# Patient Record
Sex: FEMALE | Race: WHITE | NOT HISPANIC OR LATINO | URBAN - METROPOLITAN AREA
[De-identification: names, ages, dates, MRNs, and addresses within clinical notes are randomized per-mention and may not be internally consistent; named-entity substitution may affect disease eponyms.]

---

## 2018-04-22 ENCOUNTER — EMERGENCY (EMERGENCY)
Facility: HOSPITAL | Age: 43
LOS: 1 days | Discharge: HOME | End: 2018-04-22
Admitting: EMERGENCY MEDICINE
Payer: SELF-PAY

## 2018-04-22 DIAGNOSIS — Z98.890 OTHER SPECIFIED POSTPROCEDURAL STATES: Chronic | ICD-10-CM

## 2018-04-22 PROCEDURE — 99282 EMERGENCY DEPT VISIT SF MDM: CPT

## 2018-05-09 ENCOUNTER — EMERGENCY (EMERGENCY)
Facility: HOSPITAL | Age: 43
LOS: 1 days | Discharge: HOME | End: 2018-05-09
Admitting: EMERGENCY MEDICINE
Payer: COMMERCIAL

## 2018-05-09 VITALS
SYSTOLIC BLOOD PRESSURE: 120 MMHG | DIASTOLIC BLOOD PRESSURE: 78 MMHG | HEART RATE: 68 BPM | TEMPERATURE: 98 F | RESPIRATION RATE: 16 BRPM | OXYGEN SATURATION: 98 %

## 2018-05-09 DIAGNOSIS — Z98.890 OTHER SPECIFIED POSTPROCEDURAL STATES: Chronic | ICD-10-CM

## 2018-05-09 PROCEDURE — 99282 EMERGENCY DEPT VISIT SF MDM: CPT

## 2018-05-09 NOTE — ED PROVIDER NOTE - OBJECTIVE STATEMENT
43 yo F denies pmhx here for right ear pain x 2 days. denies injury/trauma. denies other complaints. denies hx of AOM/AOE. Denies recent swimming. No meds taken. Denies fever chills discharge from ear bleeding facial pain/swelling difficulty swallowing.

## 2018-05-09 NOTE — ED PROVIDER NOTE - PLAN OF CARE
Use a decongestant such as: claritin or zyrtec or allergra. Use with FLONASE nasal spray. Use these medications for at least one week consecutively for best results. Follow up with your PMD. Rest, drink plenty of fluids.  Advance activity as tolerated.  Continue all previously prescribed medications as directed. You can use motrin 600mg every 6-8 hours for pain or fever, and/or Tylenol 650 mg every 4 hours for pain/fever. Follow up with your primary care physician in 48-72 hours- bring copies of your results.  Return to the emergency department for chest pain, shortness of breath, dizziness, or worsening, concerning, or persistent symptoms.

## 2018-05-09 NOTE — ED PROVIDER NOTE - ENMT NEGATIVE STATEMENT, MLM
Ears: +ear pain and no hearing problems.Nose: no nasal congestion and no nasal drainage.Mouth/Throat: no dysphagia, no hoarseness and no throat pain.Neck: no lumps, no pain, no stiffness and no swollen glands.

## 2018-05-09 NOTE — ED PROVIDER NOTE - ENMT, MLM
Airway patent, Nasal mucosa clear. Mouth with normal mucosa. Throat has no vesicles, no oropharyngeal exudates and uvula is midline.  EARs: bilaterally no erythema, no swelling, good landmarks/light reflex, no mucous/bleeding, no tragus tenderness.

## 2018-05-09 NOTE — ED PROVIDER NOTE - MEDICAL DECISION MAKING DETAILS
43 yo F here for ear pain. no e/o AOM or AOE. Likely allergic/sinus pressure causing fullness in ear. Otherwise well. Will dc with decongestant/flonase pmd fu, return precautions.

## 2018-05-09 NOTE — ED PROVIDER NOTE - CARE PLAN
Principal Discharge DX:	Ear pain  Assessment and plan of treatment:	Use a decongestant such as: claritin or zyrtec or allergra. Use with FLONASE nasal spray. Use these medications for at least one week consecutively for best results. Follow up with your PMD. Rest, drink plenty of fluids.  Advance activity as tolerated.  Continue all previously prescribed medications as directed. You can use motrin 600mg every 6-8 hours for pain or fever, and/or Tylenol 650 mg every 4 hours for pain/fever. Follow up with your primary care physician in 48-72 hours- bring copies of your results.  Return to the emergency department for chest pain, shortness of breath, dizziness, or worsening, concerning, or persistent symptoms.

## 2018-05-20 ENCOUNTER — EMERGENCY (EMERGENCY)
Facility: HOSPITAL | Age: 43
LOS: 0 days | Discharge: HOME | End: 2018-05-20
Attending: EMERGENCY MEDICINE
Payer: SELF-PAY

## 2018-05-20 VITALS
RESPIRATION RATE: 18 BRPM | SYSTOLIC BLOOD PRESSURE: 111 MMHG | HEART RATE: 86 BPM | TEMPERATURE: 99 F | WEIGHT: 160.06 LBS | DIASTOLIC BLOOD PRESSURE: 65 MMHG | HEIGHT: 64 IN | OXYGEN SATURATION: 98 %

## 2018-05-20 DIAGNOSIS — Z98.890 OTHER SPECIFIED POSTPROCEDURAL STATES: Chronic | ICD-10-CM

## 2018-05-20 DIAGNOSIS — H92.01 OTALGIA, RIGHT EAR: ICD-10-CM

## 2018-05-20 PROCEDURE — 99282 EMERGENCY DEPT VISIT SF MDM: CPT

## 2018-05-20 NOTE — ED PROVIDER NOTE - ENMT, MLM
Airway patent, Nasal mucosa clear. Mouth with normal mucosa. Throat has no vesicles, no oropharyngeal exudates and uvula is midline. Bilateral TM WNL

## 2018-05-20 NOTE — ED ADULT NURSE NOTE - ADDITIONAL PRINTED INSTRUCTIONS GIVEN
discharged home, instructed to follow up with otolaryngology, verbalized understanding of instructions

## 2018-05-20 NOTE — ED PROVIDER NOTE - OBJECTIVE STATEMENT
Patient states to have intermittent atraumatic right ear pain x few weeks; of note patient has had two prior ED visits at other hospital for the same complaint however states unable to take any medications or follow up

## 2018-05-20 NOTE — ED ADULT NURSE NOTE - OBJECTIVE STATEMENT
patient stated that right ear pain started 2 days ago, reports pain of 6 on a scale of 0 to 10, denies discharge

## 2018-06-16 ENCOUNTER — EMERGENCY (EMERGENCY)
Facility: HOSPITAL | Age: 43
LOS: 1 days | Discharge: HOME | End: 2018-06-16
Attending: EMERGENCY MEDICINE
Payer: SELF-PAY

## 2018-06-16 VITALS
OXYGEN SATURATION: 95 % | RESPIRATION RATE: 17 BRPM | DIASTOLIC BLOOD PRESSURE: 85 MMHG | TEMPERATURE: 98 F | SYSTOLIC BLOOD PRESSURE: 107 MMHG | HEART RATE: 89 BPM

## 2018-06-16 VITALS
OXYGEN SATURATION: 96 % | HEART RATE: 93 BPM | RESPIRATION RATE: 18 BRPM | HEIGHT: 64 IN | TEMPERATURE: 98 F | SYSTOLIC BLOOD PRESSURE: 110 MMHG | DIASTOLIC BLOOD PRESSURE: 76 MMHG | WEIGHT: 164.91 LBS

## 2018-06-16 DIAGNOSIS — Z98.890 OTHER SPECIFIED POSTPROCEDURAL STATES: Chronic | ICD-10-CM

## 2018-06-16 LAB
APPEARANCE UR: CLEAR — SIGNIFICANT CHANGE UP
BILIRUB UR-MCNC: NEGATIVE — SIGNIFICANT CHANGE UP
COLOR SPEC: YELLOW — SIGNIFICANT CHANGE UP
DIFF PNL FLD: NEGATIVE — SIGNIFICANT CHANGE UP
GLUCOSE UR QL: 50 MG/DL
HCG UR QL: NEGATIVE — SIGNIFICANT CHANGE UP
KETONES UR-MCNC: ABNORMAL
LEUKOCYTE ESTERASE UR-ACNC: NEGATIVE — SIGNIFICANT CHANGE UP
NITRITE UR-MCNC: NEGATIVE — SIGNIFICANT CHANGE UP
PH UR: 5.5 — SIGNIFICANT CHANGE UP (ref 5–8)
PROT UR-MCNC: SIGNIFICANT CHANGE UP
SP GR SPEC: 1.02 — SIGNIFICANT CHANGE UP (ref 1.01–1.02)
UROBILINOGEN FLD QL: NEGATIVE — SIGNIFICANT CHANGE UP

## 2018-06-16 PROCEDURE — 87086 URINE CULTURE/COLONY COUNT: CPT

## 2018-06-16 PROCEDURE — 99283 EMERGENCY DEPT VISIT LOW MDM: CPT

## 2018-06-16 PROCEDURE — 81025 URINE PREGNANCY TEST: CPT

## 2018-06-16 PROCEDURE — 81003 URINALYSIS AUTO W/O SCOPE: CPT

## 2018-06-16 PROCEDURE — 87591 N.GONORRHOEAE DNA AMP PROB: CPT

## 2018-06-16 PROCEDURE — 87491 CHLMYD TRACH DNA AMP PROBE: CPT

## 2018-06-16 RX ORDER — METHENAMINE MANDELATE 1 G
1 TABLET ORAL
Qty: 6 | Refills: 0 | OUTPATIENT
Start: 2018-06-16 | End: 2018-06-18

## 2018-06-16 NOTE — ED PROVIDER NOTE - MEDICAL DECISION MAKING DETAILS
42F presents with urinary hesitancy, urgency, and dysuria x couple of days. states monogamous relationship with male partner. Currently no fevers, chills, systemic signs of infection. Will check UA and obtain UCx and check HCG. Plan to treat for uncomplicated cystitis. Patient requested no serum labs be drawn as she is self-pay. 42F presents with urinary hesitancy, urgency, and dysuria x couple of days. states monogamous relationship with male partner. Currently no fevers, chills, systemic signs of infection. Will check UA and obtain UCx and check HCG. Plan to treat for uncomplicated cystitis. Patient requested no serum labs be drawn as she is self-pay.ZR

## 2018-06-16 NOTE — ED PROVIDER NOTE - NS ED ROS FT
REVIEW OF SYSTEMS:  CONSTITUTIONAL: No weakness, fevers or chills  EYES/ENT: No visual changes;  No vertigo or throat pain   NECK: No pain or stiffness  RESPIRATORY: No cough, wheezing, hemoptysis; No shortness of breath  CARDIOVASCULAR: No chest pain or palpitations  GASTROINTESTINAL: No abdominal or epigastric pain. No nausea, vomiting, or hematemesis; No diarrhea or constipation. No melena or hematochezia.  GENITOURINARY: +dysuria, frequency, urgency. No hematuria  NEUROLOGICAL: No numbness or weakness  SKIN: No itching, burning, rashes, or lesions   All other review of systems is negative unless indicated above.

## 2018-06-16 NOTE — ED PROVIDER NOTE - OBJECTIVE STATEMENT
42F PMH recurrent UTI presents to the ED c/o [id number] 42F PMH recurrent UTI presents to the ED c/o urinary frequency associated with burning on urination. Patient states that a few days ago she noticed that she has been urinating more, she has been having the "need to go" but then "nothing will come out" however, the drips that do are painful. Patient states that there is no foul smell to the urine. Patient states she is engaged, they are monogamous, and to the best of her knowledge she nor her fiance have had any STI's. Patient denies, fever, chills, N/V/D/C, myalgias, or arthralgias.

## 2018-06-16 NOTE — ED PROVIDER NOTE - PROGRESS NOTE DETAILS
UA returned negative. HCG negative. Will send 3d of macrobid to patient's pharmacy in case patient develops worsened symptoms over the weekend. Patient will f/u with PMD on Monday. Patient safe to d/c now. VSS. HDS.

## 2018-06-16 NOTE — ED PROVIDER NOTE - PHYSICAL EXAMINATION
General: WN/WD NAD  Neurology: A&Ox3, nonfocal, NOLEN x 4  Eyes: PERRLA/ EOMI, Gross vision intact  ENT/Neck: Neck supple, trachea midline, No JVD, Gross hearing intact  Respiratory: CTA B/L, No wheezing, rales, rhonchi  CV: RRR, +S1/S2, -S3/S4, no murmurs, rubs or gallops  Abdominal: Soft, NT, ND +BS, No HSM  MSK: 5/5 strength UE/LE bilaterally  Extremities: No edema, 2+ peripheral pulses  Skin: No Rashes, Hematoma, Ecchymosis

## 2018-06-16 NOTE — ED ADULT NURSE NOTE - OBJECTIVE STATEMENT
42 y.o F arrived to ED burning/pain/urgency with urination x2days. A&Ox3. Pt states she has had UTIs in the past, symptoms now feel similar. is sexually active, unprotected, one partner. no significant pmh. no fevers/chills. respirations nonlabored, pt in no acute distress, abdomen soft, neuro intact, mvoes all extremities. Denies CP, SOB, N/V/D, HA, dizziness. Safety and comfort provided/maintained.

## 2018-06-16 NOTE — ED ADULT NURSE NOTE - DISCHARGE TEACHING
Pt instructed to followup with PMD in 24-48 hours; instructed on prescribed medication use; verbalized understanding.

## 2018-06-17 LAB
CULTURE RESULTS: NO GROWTH — SIGNIFICANT CHANGE UP
SPECIMEN SOURCE: SIGNIFICANT CHANGE UP

## 2018-06-18 LAB
C TRACH RRNA SPEC QL NAA+PROBE: SIGNIFICANT CHANGE UP
N GONORRHOEA RRNA SPEC QL NAA+PROBE: SIGNIFICANT CHANGE UP
SPECIMEN SOURCE: SIGNIFICANT CHANGE UP

## 2018-10-17 PROBLEM — G43.909 MIGRAINE, UNSPECIFIED, NOT INTRACTABLE, WITHOUT STATUS MIGRAINOSUS: Chronic | Status: ACTIVE | Noted: 2018-05-20

## 2018-11-02 PROBLEM — Z00.00 ENCOUNTER FOR PREVENTIVE HEALTH EXAMINATION: Status: ACTIVE | Noted: 2018-11-02

## 2018-12-27 ENCOUNTER — APPOINTMENT (OUTPATIENT)
Dept: RHEUMATOLOGY | Facility: CLINIC | Age: 43
End: 2018-12-27

## 2019-01-02 ENCOUNTER — TRANSCRIPTION ENCOUNTER (OUTPATIENT)
Age: 44
End: 2019-01-02

## 2019-01-02 ENCOUNTER — EMERGENCY (EMERGENCY)
Facility: HOSPITAL | Age: 44
LOS: 0 days | Discharge: HOME | End: 2019-01-02
Attending: EMERGENCY MEDICINE | Admitting: EMERGENCY MEDICINE

## 2019-01-02 VITALS
HEART RATE: 82 BPM | DIASTOLIC BLOOD PRESSURE: 70 MMHG | TEMPERATURE: 96 F | RESPIRATION RATE: 18 BRPM | SYSTOLIC BLOOD PRESSURE: 129 MMHG | OXYGEN SATURATION: 99 %

## 2019-01-02 DIAGNOSIS — Z98.890 OTHER SPECIFIED POSTPROCEDURAL STATES: Chronic | ICD-10-CM

## 2019-01-02 DIAGNOSIS — R39.9 UNSPECIFIED SYMPTOMS AND SIGNS INVOLVING THE GENITOURINARY SYSTEM: ICD-10-CM

## 2019-01-02 NOTE — ED PROVIDER NOTE - PROGRESS NOTE DETAILS
patient not at assigned location patient not at assigned location. searched around ED but patient unable to be found. Left without being seen

## 2019-05-23 ENCOUNTER — APPOINTMENT (OUTPATIENT)
Dept: RHEUMATOLOGY | Facility: CLINIC | Age: 44
End: 2019-05-23

## 2021-09-21 ENCOUNTER — TRANSCRIPTION ENCOUNTER (OUTPATIENT)
Age: 46
End: 2021-09-21

## 2023-05-23 NOTE — ED ADULT TRIAGE NOTE - TEMPERATURE IN FAHRENHEIT (DEGREES F)
----- Message from Enrico Holley MD sent at 5/23/2023  8:35 AM CDT -----  Blood work reviewed.  Negative pregnancy test.  Liver enzymes are within normal limits however there is mild elevation in bilirubin.  Blood work consistent with alcohol use.  Continue to strive for alcohol abstinence.  Will follow-up as planned.  
Message sent via patient portal.   
98.9

## 2023-10-09 ENCOUNTER — APPOINTMENT (OUTPATIENT)
Dept: OPHTHALMOLOGY | Facility: CLINIC | Age: 48
End: 2023-10-09

## 2024-01-12 ENCOUNTER — NON-APPOINTMENT (OUTPATIENT)
Age: 49
End: 2024-01-12

## 2024-01-12 ENCOUNTER — APPOINTMENT (OUTPATIENT)
Dept: RHEUMATOLOGY | Facility: CLINIC | Age: 49
End: 2024-01-12
Payer: MEDICAID

## 2024-01-12 VITALS
DIASTOLIC BLOOD PRESSURE: 80 MMHG | HEART RATE: 79 BPM | BODY MASS INDEX: 29.02 KG/M2 | SYSTOLIC BLOOD PRESSURE: 114 MMHG | OXYGEN SATURATION: 96 % | HEIGHT: 64 IN | WEIGHT: 170 LBS

## 2024-01-12 DIAGNOSIS — M25.50 PAIN IN UNSPECIFIED JOINT: ICD-10-CM

## 2024-01-12 DIAGNOSIS — R76.8 OTHER SPECIFIED ABNORMAL IMMUNOLOGICAL FINDINGS IN SERUM: ICD-10-CM

## 2024-01-12 DIAGNOSIS — R53.82 CHRONIC FATIGUE, UNSPECIFIED: ICD-10-CM

## 2024-01-12 PROCEDURE — 99204 OFFICE O/P NEW MOD 45 MIN: CPT | Mod: 25

## 2024-01-12 PROCEDURE — 36415 COLL VENOUS BLD VENIPUNCTURE: CPT

## 2024-01-12 NOTE — PHYSICAL EXAM
[TextEntry] : GENERAL: Appears in no acute distress  HEENT: EOMI, PERRLA. No conjunctival erythema. Moist mucous membranes. No nasopharyngeal ulcers  NECK: Supple, no cervical lymphadenopathy, no thyromegaly  CARDIOVASCULAR: RRR. S1, S2 auscultated. No murmurs or rubs.  PULMONARY: Clear to auscultation b/l, no wheezes, rales, or crackles  ABDOMINAL: Soft, nontender, nondistended. Bowel sounds present. No organomegaly.  MSK:  No active synovitis, swelling, erythema, or warmth.  No joint tenderness to palpation.  No deformities.  Normal ROM of neck, back, b/l upper and lower extremities.  SKIN: No lesions or rashes  NEURO: No focal deficits. PSYCH: AAOx3. Normal affect and thought process.

## 2024-01-12 NOTE — ASSESSMENT
[FreeTextEntry1] : 49 y/o female w/ occipital and trigeminal neuralgia (receives botox injections), HLD referred to rheumatology for evaluation for autoimmune diseases in setting of family history. Pt was diagnosed with SLE 6 years ago in setting of sudden onset of arm rash, fatigue, malaise with CAMILO+ with a rheumatologist. Pt was then seen by Dr. Andersen around the same time was not quite sure about her diagnosis. Pt was treated with steroids, no DMARDs. Pt reports recurrent episodes of severe fatigue, joint pains including legs, arm (Denies joint swelling, redness). Reports dry eyes, dry mouth. Pt has not had rashes since 6 years ago. Denies Raynaud's, oral ulcers Reports bout of hair loss at the time of COVID infection. Pt has known DDD of C-spine and L-spine Mother - RA, fibromyalgia. Aunts - Sjogren's, Hashimoto's.  Pt reports Hx of positive CAMILO, nonspecific symptoms of non-inflammatory joint pains, fatigue, sicca symptoms with family Hx of autoimmune diseases. Pt does not have more specific findings such as Raynaud's, oral ulcers, photosensitive rashes. Although I would agree with general workup to evaluate for signs of SLE and related diseases, I do not have high suspicion.  - Obtain labwork to evaluate for signs of inflammatory arthritis - Pt to continue follow up with neuropathies (b/l hands numbness found to be ulnar tunnel syndrome on NCV and b/l feet numbness likely lumbar radiculopathy).and advised on bracing - Consider follow up with podiatry for b/l feet pain likely plantar fasciitis by descriptions (pains worst in AM improves with walking) - Will contact pt with results. RTC 2 months for follow up

## 2024-01-12 NOTE — HISTORY OF PRESENT ILLNESS
[FreeTextEntry1] : 49 y/o female w/ occipital and trigeminal neuralgia (receives botox injections), HLD referred to rheumatology for evaluation for autoimmune diseases in setting of family history. Pt was diagnosed with SLE 6 years ago in setting of sudden onset of arm rash, fatigue, malaise with CAMILO+ with a rheumatologist. Pt was then seen by Dr. Andersen around the same time was not quite sure about her diagnosis. Pt was treated with steroids, no DMARDs. Pt reports recurrent episodes of severe fatigue, joint pains including legs, arm (Denies joint swelling, redness). Reports dry eyes, dry mouth. Pt has not had rashes since 6 years ago. Denies Raynaud's, oral ulcers Reports bout of hair loss at the time of COVID infection. Pt has known DDD of C-spine and L-spine Mother - RA, fibromyalgia. Aunts - Sjogren's, Hashimoto's.

## 2024-01-14 LAB
ALBUMIN SERPL ELPH-MCNC: 4.5 G/DL
ALP BLD-CCNC: 81 U/L
ALT SERPL-CCNC: 29 U/L
ANION GAP SERPL CALC-SCNC: 16 MMOL/L
APPEARANCE: CLEAR
AST SERPL-CCNC: 18 U/L
BILIRUB SERPL-MCNC: 0.2 MG/DL
BILIRUBIN URINE: NEGATIVE
BLOOD URINE: NEGATIVE
BUN SERPL-MCNC: 18 MG/DL
CALCIUM SERPL-MCNC: 9.8 MG/DL
CHLORIDE SERPL-SCNC: 104 MMOL/L
CO2 SERPL-SCNC: 23 MMOL/L
COLOR: YELLOW
CREAT SERPL-MCNC: 0.69 MG/DL
CREAT SPEC-SCNC: 67 MG/DL
CREAT/PROT UR: 0.1 RATIO
CRP SERPL-MCNC: <3 MG/L
EGFR: 108 ML/MIN/1.73M2
ERYTHROCYTE [SEDIMENTATION RATE] IN BLOOD BY WESTERGREN METHOD: 14 MM/HR
GLUCOSE QUALITATIVE U: NEGATIVE MG/DL
GLUCOSE SERPL-MCNC: 142 MG/DL
HCT VFR BLD CALC: 40.4 %
HGB BLD-MCNC: 13.8 G/DL
KETONES URINE: NEGATIVE MG/DL
LEUKOCYTE ESTERASE URINE: NEGATIVE
MCHC RBC-ENTMCNC: 31.4 PG
MCHC RBC-ENTMCNC: 34.2 GM/DL
MCV RBC AUTO: 92 FL
NITRITE URINE: NEGATIVE
PH URINE: 5.5
PLATELET # BLD AUTO: 278 K/UL
POTASSIUM SERPL-SCNC: 4.2 MMOL/L
PROT SERPL-MCNC: 6.8 G/DL
PROT UR-MCNC: 4 MG/DL
PROTEIN URINE: NEGATIVE MG/DL
RBC # BLD: 4.39 M/UL
RBC # FLD: 12.5 %
RHEUMATOID FACT SER QL: 10 IU/ML
SODIUM SERPL-SCNC: 143 MMOL/L
SPECIFIC GRAVITY URINE: 1.02
THYROGLOB AB SERPL-ACNC: <20 IU/ML
THYROPEROXIDASE AB SERPL IA-ACNC: <10 IU/ML
UROBILINOGEN URINE: 0.2 MG/DL
WBC # FLD AUTO: 7.18 K/UL

## 2024-01-16 LAB
C3 SERPL-MCNC: 149 MG/DL
C4 SERPL-MCNC: 29 MG/DL
ENA RNP AB SER IA-ACNC: <0.2 AL
ENA SM AB SER IA-ACNC: <0.2 AL
ENA SS-A AB SER IA-ACNC: <0.2 AL
ENA SS-B AB SER IA-ACNC: <0.2 AL

## 2024-01-17 LAB — DSDNA AB SER-ACNC: <12 IU/ML

## 2024-01-18 LAB
ANA PAT FLD IF-IMP: NORMAL
ANA SER IF-ACNC: ABNORMAL
CCP AB SER IA-ACNC: <8 UNITS
RF+CCP IGG SER-IMP: NEGATIVE

## 2024-01-19 LAB — 14-3-3 ETA AG SER IA-MCNC: <0.2 NG/ML

## 2024-03-08 ENCOUNTER — APPOINTMENT (OUTPATIENT)
Dept: RHEUMATOLOGY | Facility: CLINIC | Age: 49
End: 2024-03-08

## 2024-05-28 NOTE — ED ADULT TRIAGE NOTE - CHIEF COMPLAINT QUOTE
urinary symptoms
[facility]  [address] [place] [id number]  Phone: [phone]  Fax: [phone]  Follow Up Time: 1-3 Days

## 2025-06-12 NOTE — ED PROVIDER NOTE - CPE EDP ENMT NORM
Called ptporsche that I called to confirm her appt with Dr Oliveros tomorrow Fri 6/13 at 9:40am in [facility], to call us back at [phone] to confirm or cancel appt.   normal...